# Patient Record
Sex: FEMALE | NOT HISPANIC OR LATINO | URBAN - METROPOLITAN AREA
[De-identification: names, ages, dates, MRNs, and addresses within clinical notes are randomized per-mention and may not be internally consistent; named-entity substitution may affect disease eponyms.]

---

## 2024-11-16 ENCOUNTER — OFFICE VISIT (OUTPATIENT)
Dept: URGENT CARE | Age: 44
End: 2024-11-16
Payer: COMMERCIAL

## 2024-11-16 VITALS
HEIGHT: 65 IN | TEMPERATURE: 96.5 F | BODY MASS INDEX: 24.99 KG/M2 | SYSTOLIC BLOOD PRESSURE: 115 MMHG | WEIGHT: 150 LBS | RESPIRATION RATE: 18 BRPM | DIASTOLIC BLOOD PRESSURE: 76 MMHG | OXYGEN SATURATION: 97 % | HEART RATE: 84 BPM

## 2024-11-16 DIAGNOSIS — J01.00 ACUTE NON-RECURRENT MAXILLARY SINUSITIS: ICD-10-CM

## 2024-11-16 DIAGNOSIS — J06.9 VIRAL URI: Primary | ICD-10-CM

## 2024-11-16 DIAGNOSIS — Z20.822 CONTACT WITH AND (SUSPECTED) EXPOSURE TO COVID-19: ICD-10-CM

## 2024-11-16 LAB
POC CORONAVIRUS SARS-COV-2 PCR: NEGATIVE
POC HUMAN RHINOVIRUS PCR: POSITIVE
POC INFLUENZA A VIRUS PCR: NEGATIVE
POC INFLUENZA B VIRUS PCR: NEGATIVE
POC RESPIRATORY SYNCYTIAL VIRUS PCR: NEGATIVE

## 2024-11-16 RX ORDER — AZITHROMYCIN 250 MG/1
TABLET, FILM COATED ORAL
Qty: 6 TABLET | Refills: 0 | Status: SHIPPED | OUTPATIENT
Start: 2024-11-16

## 2024-11-16 RX ORDER — BROMPHENIRAMINE MALEATE, PSEUDOEPHEDRINE HYDROCHLORIDE, AND DEXTROMETHORPHAN HYDROBROMIDE 2; 30; 10 MG/5ML; MG/5ML; MG/5ML
10 SYRUP ORAL 4 TIMES DAILY PRN
Qty: 120 ML | Refills: 0 | Status: SHIPPED | OUTPATIENT
Start: 2024-11-16 | End: 2024-11-26

## 2024-11-16 ASSESSMENT — PAIN SCALES - GENERAL: PAINLEVEL_OUTOF10: 2

## 2024-11-16 ASSESSMENT — ENCOUNTER SYMPTOMS: COUGH: 1

## 2024-11-16 NOTE — PROGRESS NOTES
"Subjective   Patient ID: Lindsay Null is a 44 y.o. female. They present today with a chief complaint of Earache and Cough (Sick for over 2 weeks cough drainage).    History of Present Illness  Lindsay is a healthy 44 year old female presents to  with cough and congestion x 2 weeks. Associated ear pain and pressure, worse R than L. No fevers. Cough at times productive of mucous, more in the morning. No fevers. Pt does work in a school, multiple exposures.       Earache   Associated symptoms include coughing.   Cough  Associated symptoms include ear pain.       Past Medical History  Allergies as of 11/16/2024 - Reviewed 11/16/2024   Allergen Reaction Noted    Amoxicillin Hives 11/16/2024    Augmentin [amoxicillin-pot clavulanate] Swelling 11/16/2024       (Not in a hospital admission)       History reviewed. No pertinent past medical history.    History reviewed. No pertinent surgical history.     reports that she has never smoked. She does not have any smokeless tobacco history on file. She reports that she does not currently use alcohol. She reports that she does not use drugs.    Review of Systems  Review of Systems   HENT:  Positive for ear pain.    Respiratory:  Positive for cough.                                   Objective    Vitals:    11/16/24 0901   BP: 115/76   BP Location: Right arm   Patient Position: Sitting   BP Cuff Size: Adult   Pulse: 84   Resp: 18   Temp: 35.8 °C (96.5 °F)   TempSrc: Skin   SpO2: 97%   Weight: 68 kg (150 lb)   Height: 1.651 m (5' 5\")     Patient's last menstrual period was 11/04/2024 (exact date).    Physical Exam  Vitals and nursing note reviewed.   Constitutional:       General: She is not in acute distress.     Appearance: Normal appearance.   HENT:      Head: Normocephalic and atraumatic.      Right Ear: Tympanic membrane and ear canal normal.      Left Ear: Tympanic membrane and ear canal normal.      Nose: Congestion present. No rhinorrhea.      Comments: Mild ETD b/l. R " worse than L      Mouth/Throat:      Mouth: Mucous membranes are moist.      Pharynx: No oropharyngeal exudate or posterior oropharyngeal erythema.   Eyes:      Extraocular Movements: Extraocular movements intact.      Conjunctiva/sclera: Conjunctivae normal.      Pupils: Pupils are equal, round, and reactive to light.   Cardiovascular:      Rate and Rhythm: Normal rate and regular rhythm.      Heart sounds: No murmur heard.  Pulmonary:      Effort: Pulmonary effort is normal.      Breath sounds: Normal breath sounds. No wheezing.   Skin:     General: Skin is warm and dry.   Neurological:      General: No focal deficit present.      Mental Status: She is alert and oriented to person, place, and time.   Psychiatric:         Mood and Affect: Mood normal.         Procedures    Point of Care Test & Imaging Results from this visit  Results for orders placed or performed in visit on 11/16/24   POCT SPOTFIRE R/ST Panel Mini w/COVID ([facility]) manually resulted    Specimen: Swab   Result Value Ref Range    POC Sars-Cov-2 PCR Negative Negative    POC Respiratory Syncytial Virus PCR Negative Negative    POC Influenza A Virus PCR Negative Negative    POC Influenza B Virus PCR Negative Negative    POC Human Rhinovirus PCR Positive (A) Negative      No results found.    Diagnostic study results (if any) were reviewed by Bessie Zhang PA-C.    Assessment/Plan   Allergies, medications, history, and pertinent labs/EKGs/Imaging reviewed by Bessie Zhang PA-C.     Medical Decision Making  Lindsay presents with URI symptoms x 2 weeks. Rapid Spotfire positive for rhinovirus. Exam suggestive of a secondary sinusitis with ongoing symptoms, congestion and facial pain. Will add in zithromax and bromfed DM. Plan of care discussed with patient and/or family who verbalized understanding. Recommend Follow up with PCP. Advised seeking immediate emergency medical attention if symptoms fail to improve, worsen or any concerning symptoms  arise. Patient/Guardian voiced full understanding and agreement to plan.      Orders and Diagnoses  Diagnoses and all orders for this visit:  Viral URI  Contact with and (suspected) exposure to covid-19  -     POCT SPOTFIRE R/ST Panel Mini w/COVID ([facility]) manually resulted  Acute non-recurrent maxillary sinusitis  -     azithromycin (Zithromax) 250 mg tablet; Take 2 tabs (500 mg) by mouth today, than 1 daily for 4 days.  -     brompheniramine-pseudoeph-DM 2-30-10 mg/5 mL syrup; Take 10 mL by mouth 4 times a day as needed for allergies, congestion or cough for up to 10 days.      Medical Admin Record      Patient disposition: Home    Electronically signed by Bessie Zhang PA-C  9:41 AM